# Patient Record
Sex: MALE | Race: WHITE | NOT HISPANIC OR LATINO
[De-identification: names, ages, dates, MRNs, and addresses within clinical notes are randomized per-mention and may not be internally consistent; named-entity substitution may affect disease eponyms.]

---

## 2018-10-17 ENCOUNTER — RX RENEWAL (OUTPATIENT)
Age: 58
End: 2018-10-17

## 2018-10-17 PROBLEM — Z00.00 ENCOUNTER FOR PREVENTIVE HEALTH EXAMINATION: Status: ACTIVE | Noted: 2018-10-17

## 2018-10-18 ENCOUNTER — RX RENEWAL (OUTPATIENT)
Age: 58
End: 2018-10-18

## 2018-10-18 DIAGNOSIS — N52.9 MALE ERECTILE DYSFUNCTION, UNSPECIFIED: ICD-10-CM

## 2018-11-20 ENCOUNTER — RECORD ABSTRACTING (OUTPATIENT)
Age: 58
End: 2018-11-20

## 2018-11-20 DIAGNOSIS — Z78.9 OTHER SPECIFIED HEALTH STATUS: ICD-10-CM

## 2018-11-20 DIAGNOSIS — Z86.39 PERSONAL HISTORY OF OTHER ENDOCRINE, NUTRITIONAL AND METABOLIC DISEASE: ICD-10-CM

## 2018-11-20 DIAGNOSIS — Z83.49 FAMILY HISTORY OF OTHER ENDOCRINE, NUTRITIONAL AND METABOLIC DISEASES: ICD-10-CM

## 2018-11-20 DIAGNOSIS — Z86.79 PERSONAL HISTORY OF OTHER DISEASES OF THE CIRCULATORY SYSTEM: ICD-10-CM

## 2018-11-20 DIAGNOSIS — Z83.3 FAMILY HISTORY OF DIABETES MELLITUS: ICD-10-CM

## 2018-11-20 DIAGNOSIS — I51.9 HEART DISEASE, UNSPECIFIED: ICD-10-CM

## 2018-11-21 ENCOUNTER — APPOINTMENT (OUTPATIENT)
Dept: ENDOCRINOLOGY | Facility: CLINIC | Age: 58
End: 2018-11-21
Payer: COMMERCIAL

## 2018-11-21 VITALS
SYSTOLIC BLOOD PRESSURE: 118 MMHG | WEIGHT: 185 LBS | HEART RATE: 66 BPM | BODY MASS INDEX: 29.73 KG/M2 | DIASTOLIC BLOOD PRESSURE: 70 MMHG | HEIGHT: 66 IN

## 2018-11-21 DIAGNOSIS — E66.01 MORBID (SEVERE) OBESITY DUE TO EXCESS CALORIES: ICD-10-CM

## 2018-11-21 LAB — GLUCOSE BLDC GLUCOMTR-MCNC: 107

## 2018-11-21 PROCEDURE — 82962 GLUCOSE BLOOD TEST: CPT

## 2018-11-21 PROCEDURE — 99214 OFFICE O/P EST MOD 30 MIN: CPT | Mod: 25

## 2019-05-17 ENCOUNTER — APPOINTMENT (OUTPATIENT)
Dept: ENDOCRINOLOGY | Facility: CLINIC | Age: 59
End: 2019-05-17
Payer: COMMERCIAL

## 2019-05-17 VITALS
DIASTOLIC BLOOD PRESSURE: 70 MMHG | HEIGHT: 66 IN | SYSTOLIC BLOOD PRESSURE: 110 MMHG | WEIGHT: 192 LBS | BODY MASS INDEX: 30.86 KG/M2 | OXYGEN SATURATION: 98 % | HEART RATE: 63 BPM

## 2019-05-17 LAB
CHOLEST SERPL-MCNC: 137
GLUCOSE BLDC GLUCOMTR-MCNC: 119
GLUCOSE SERPL-MCNC: 90
HBA1C MFR BLD HPLC: 8.3
HDLC SERPL-MCNC: 69
LDLC SERPL DIRECT ASSAY-MCNC: 54
MICROALBUMIN/CREAT 24H UR-RTO: 13

## 2019-05-17 PROCEDURE — 99214 OFFICE O/P EST MOD 30 MIN: CPT | Mod: 25

## 2019-05-17 PROCEDURE — 82962 GLUCOSE BLOOD TEST: CPT

## 2019-05-17 RX ORDER — TADALAFIL 20 MG/1
20 TABLET ORAL
Qty: 20 | Refills: 3 | Status: DISCONTINUED | COMMUNITY
Start: 2018-10-18 | End: 2019-05-17

## 2019-05-17 RX ORDER — BLOOD SUGAR DIAGNOSTIC
STRIP MISCELLANEOUS
Refills: 0 | Status: DISCONTINUED | COMMUNITY
End: 2019-05-17

## 2019-05-17 NOTE — HISTORY OF PRESENT ILLNESS
[FreeTextEntry1] : Quality:  Type 2.   \par Severity:  Moderate\par Duration:  23 years\par Modifying Factors:  Better with diet.  Better with medication.   \par Notes:  Current regimen:\par 	Metformin 500 mg, 2 tabs BID ER\par 	Humalog 75/25 used PRN\par \par \par On avandia in the past, on byetta. Off actos when invokana started with loss of control since. No response to invokana.\par \par Diet:  trying to watch diet, occasional non-adherence\par \par Weight History:  \par Continued weight loss with gastric sleeve- total of 108 since January 2017\par \par Blood Glucose Testing Information \par \par Patient is using the  meter and is testing 2 times per day.\par \par scheduled for surgical procedure to remove excess skin\par \par \par Past Medical History\par Notes:  ASHD, stents. \par RBBB\par

## 2019-05-17 NOTE — REVIEW OF SYSTEMS
[Recent Weight Gain (___ Lbs)] : recent [unfilled] ~Ulb weight gain [Heartburn] : heartburn [Fatigue] : no fatigue

## 2019-05-17 NOTE — ASSESSMENT
[FreeTextEntry1] : Excellent control of diabetes, hyperlipidemia and hypertension.\par MIld anemia due to recent blood donation

## 2019-05-20 ENCOUNTER — MEDICATION RENEWAL (OUTPATIENT)
Age: 59
End: 2019-05-20

## 2019-09-01 ENCOUNTER — RX RENEWAL (OUTPATIENT)
Age: 59
End: 2019-09-01

## 2019-09-30 ENCOUNTER — MEDICATION RENEWAL (OUTPATIENT)
Age: 59
End: 2019-09-30

## 2019-10-03 ENCOUNTER — RX RENEWAL (OUTPATIENT)
Age: 59
End: 2019-10-03

## 2019-11-18 ENCOUNTER — APPOINTMENT (OUTPATIENT)
Dept: ENDOCRINOLOGY | Facility: CLINIC | Age: 59
End: 2019-11-18
Payer: COMMERCIAL

## 2019-11-18 VITALS
BODY MASS INDEX: 32.3 KG/M2 | HEART RATE: 70 BPM | DIASTOLIC BLOOD PRESSURE: 70 MMHG | HEIGHT: 66 IN | WEIGHT: 201 LBS | SYSTOLIC BLOOD PRESSURE: 112 MMHG

## 2019-11-18 LAB
GLUCOSE BLDC GLUCOMTR-MCNC: 117
HBA1C MFR BLD HPLC: 6.8
LDLC SERPL DIRECT ASSAY-MCNC: 57
MICROALBUMIN/CREAT 24H UR-RTO: 10

## 2019-11-18 PROCEDURE — 99214 OFFICE O/P EST MOD 30 MIN: CPT | Mod: 25

## 2019-11-18 PROCEDURE — 82962 GLUCOSE BLOOD TEST: CPT | Mod: NC

## 2019-11-18 RX ORDER — SYRINGE-NEEDLE,INSULIN,0.5 ML 31 GX5/16"
31G X 5/16" SYRINGE, EMPTY DISPOSABLE MISCELLANEOUS
Refills: 0 | Status: DISCONTINUED | COMMUNITY
End: 2019-11-18

## 2019-11-18 NOTE — ASSESSMENT
[FreeTextEntry1] : Excellent control of diabetes, hyperlipidemia and hypertension.\par MIld anemia due to recent blood donation; however ferritin low; increase oral iron and repepat in one month. If not improved will refer to hematology\par

## 2020-04-16 ENCOUNTER — RX RENEWAL (OUTPATIENT)
Age: 60
End: 2020-04-16

## 2020-05-21 ENCOUNTER — APPOINTMENT (OUTPATIENT)
Dept: ENDOCRINOLOGY | Facility: CLINIC | Age: 60
End: 2020-05-21
Payer: COMMERCIAL

## 2020-05-21 DIAGNOSIS — E11.65 TYPE 2 DIABETES MELLITUS WITH HYPERGLYCEMIA: ICD-10-CM

## 2020-05-21 PROCEDURE — 99214 OFFICE O/P EST MOD 30 MIN: CPT | Mod: 95

## 2020-05-21 NOTE — REVIEW OF SYSTEMS
[Joint Pain] : joint pain [Chest Pain] : no chest pain [Shortness Of Breath] : no shortness of breath [FreeTextEntry9] : shoulder pain, s/p steroid injection

## 2020-05-21 NOTE — PHYSICAL EXAM
[Alert] : alert [Normal Insight/Judgement] : insight and judgment were intact [Oriented x3] : oriented to person, place, and time [No Acute Distress] : no acute distress

## 2020-05-21 NOTE — ASSESSMENT
[FreeTextEntry1] : DM type 2, progressive but mild loss of control. Will add ozempic\par Hyperlipidemia controlled\par check ferritin level

## 2020-05-21 NOTE — HISTORY OF PRESENT ILLNESS
[Other Location: e.g. Home (Enter Location, City,State)___] : at [unfilled] [Home] : at home, [unfilled] , at the time of the visit. [Verbal consent obtained from patient] : the patient, [unfilled] [FreeTextEntry1] : Quality:  Type 2.   \par Severity:  Moderate\par Duration:  23 years\par Modifying Factors:  Better with diet.  Better with medication.   \par Notes:  Current regimen:\par 	Metformin 500 mg, 2 tabs BID ER\par 	Humalog 75/25 used PRN\par \par \par On avandia in the past, on byetta. Off actos when invokana. No response to invokana.\par \par Diet:  trying to watch diet, occasional non-adherence\par \par Weight History:  \par Continued weight loss with gastric sleeve- total of 108 since January 2017\par \par Blood Glucose Testing Information \par \par Patient is using the  meter and is testing 2 times per day.\par \par \par Past Medical History\par Notes:  ASHD, stents. \par RBBB\par

## 2020-07-16 LAB
HBA1C MFR BLD HPLC: 7.3
LDLC SERPL DIRECT ASSAY-MCNC: 66
MICROALBUMIN/CREAT 24H UR-RTO: 8

## 2020-07-17 ENCOUNTER — TRANSCRIPTION ENCOUNTER (OUTPATIENT)
Age: 60
End: 2020-07-17

## 2020-07-17 ENCOUNTER — APPOINTMENT (OUTPATIENT)
Dept: ENDOCRINOLOGY | Facility: CLINIC | Age: 60
End: 2020-07-17
Payer: COMMERCIAL

## 2020-07-17 VITALS
HEIGHT: 66 IN | DIASTOLIC BLOOD PRESSURE: 70 MMHG | SYSTOLIC BLOOD PRESSURE: 120 MMHG | HEART RATE: 82 BPM | BODY MASS INDEX: 32.78 KG/M2 | WEIGHT: 204 LBS

## 2020-07-17 LAB — GLUCOSE BLDC GLUCOMTR-MCNC: 200

## 2020-07-17 PROCEDURE — 99214 OFFICE O/P EST MOD 30 MIN: CPT | Mod: 25

## 2020-07-17 PROCEDURE — 82962 GLUCOSE BLOOD TEST: CPT

## 2020-07-17 NOTE — HISTORY OF PRESENT ILLNESS
[FreeTextEntry1] : Quality:  Type 2.   \par Severity:  Moderate\par Duration:  23 years\par Modifying Factors:  Better with diet.  Better with medication.   \par Notes:  Current regimen:\par 	Metformin 500 mg, 2 tabs BID ER\par 	Humalog 75/25 used PRN\par                 Ozempic .5\par \par \par On avandia in the past, on byetta. Off actos when invokana. No response to invokana.\par \par Diet:  trying to watch diet, occasional non-adherence\par \par Weight History:  \par Continued weight loss with gastric sleeve- total of 108 since January 2017\par \par Blood Glucose Testing Information \par \par Patient is using the  meter and is testing 2 times per day.\par \par \par Past Medical History\par Notes:  ASHD, stents. \par RBBB\par

## 2020-07-17 NOTE — ASSESSMENT
[FreeTextEntry1] : DM type 2, improving control on ozempic by history; labs pending to verify. Discussed option of increasing to 1 mg if control declines.\par Hyperlipidemia, on therapy\par hypertension controlled\par

## 2020-07-17 NOTE — PHYSICAL EXAM
[Clear to Auscultation] : lungs were clear to auscultation bilaterally [Thyroid Not Enlarged] : the thyroid was not enlarged [Normal Rate] : heart rate was normal [Regular Rhythm] : with a regular rhythm [Right Foot Was Examined] : right foot ~C was examined [Left Foot Was Examined] : left foot ~C was examined

## 2020-07-21 ENCOUNTER — TRANSCRIPTION ENCOUNTER (OUTPATIENT)
Age: 60
End: 2020-07-21

## 2020-10-01 LAB
HBA1C MFR BLD HPLC: 6.2
LDLC SERPL CALC-MCNC: 61
MICROALBUMIN/CREAT 24H UR-RTO: 13

## 2020-10-02 ENCOUNTER — APPOINTMENT (OUTPATIENT)
Dept: ENDOCRINOLOGY | Facility: CLINIC | Age: 60
End: 2020-10-02
Payer: COMMERCIAL

## 2020-10-02 VITALS
DIASTOLIC BLOOD PRESSURE: 80 MMHG | OXYGEN SATURATION: 99 % | HEART RATE: 83 BPM | BODY MASS INDEX: 31.82 KG/M2 | WEIGHT: 198 LBS | HEIGHT: 66 IN | SYSTOLIC BLOOD PRESSURE: 120 MMHG

## 2020-10-02 LAB — GLUCOSE BLDC GLUCOMTR-MCNC: 93

## 2020-10-02 PROCEDURE — 99214 OFFICE O/P EST MOD 30 MIN: CPT | Mod: 25

## 2020-10-02 PROCEDURE — 82962 GLUCOSE BLOOD TEST: CPT

## 2020-10-02 NOTE — ASSESSMENT
[FreeTextEntry1] : DM type 2, excellent control\par Hyperlipidemia, on therapy\par hypertension controlled\par discussed iron deficiency. Pt. will continue supplements orally with ascorbic acid\par

## 2021-02-17 LAB
HBA1C MFR BLD HPLC: 6.1
LDLC SERPL DIRECT ASSAY-MCNC: 72
MICROALBUMIN/CREAT 24H UR-RTO: 9

## 2021-02-18 ENCOUNTER — APPOINTMENT (OUTPATIENT)
Dept: ENDOCRINOLOGY | Facility: CLINIC | Age: 61
End: 2021-02-18
Payer: COMMERCIAL

## 2021-02-18 VITALS
WEIGHT: 192 LBS | OXYGEN SATURATION: 99 % | SYSTOLIC BLOOD PRESSURE: 140 MMHG | BODY MASS INDEX: 30.86 KG/M2 | HEIGHT: 66 IN | DIASTOLIC BLOOD PRESSURE: 80 MMHG | HEART RATE: 78 BPM

## 2021-02-18 LAB — GLUCOSE BLDC GLUCOMTR-MCNC: 103

## 2021-02-18 PROCEDURE — 99072 ADDL SUPL MATRL&STAF TM PHE: CPT

## 2021-02-18 PROCEDURE — 99214 OFFICE O/P EST MOD 30 MIN: CPT | Mod: 25

## 2021-02-18 PROCEDURE — 82962 GLUCOSE BLOOD TEST: CPT

## 2021-02-18 NOTE — ASSESSMENT
[FreeTextEntry1] : DM type 2, excellent control\par Hyperlipidemia, on therapy\par hypertension controlled\par discussed iron deficiency. May need iron infusion\par ED, unresponsive to Cialis and viagra, will try levitra. If no response needs urology. Will check T level and COVID AB as per pt's request (He has been vaccinated)\par

## 2021-03-03 ENCOUNTER — TRANSCRIPTION ENCOUNTER (OUTPATIENT)
Age: 61
End: 2021-03-03

## 2021-03-17 ENCOUNTER — TRANSCRIPTION ENCOUNTER (OUTPATIENT)
Age: 61
End: 2021-03-17

## 2021-03-18 ENCOUNTER — TRANSCRIPTION ENCOUNTER (OUTPATIENT)
Age: 61
End: 2021-03-18

## 2021-09-03 ENCOUNTER — APPOINTMENT (OUTPATIENT)
Dept: ENDOCRINOLOGY | Facility: CLINIC | Age: 61
End: 2021-09-03
Payer: COMMERCIAL

## 2021-09-03 VITALS
SYSTOLIC BLOOD PRESSURE: 130 MMHG | WEIGHT: 197 LBS | HEART RATE: 74 BPM | HEIGHT: 66 IN | BODY MASS INDEX: 31.66 KG/M2 | DIASTOLIC BLOOD PRESSURE: 80 MMHG

## 2021-09-03 LAB — GLUCOSE BLDC GLUCOMTR-MCNC: 119

## 2021-09-03 PROCEDURE — 99214 OFFICE O/P EST MOD 30 MIN: CPT | Mod: 25

## 2021-09-03 PROCEDURE — 82962 GLUCOSE BLOOD TEST: CPT

## 2021-09-03 NOTE — ASSESSMENT
[FreeTextEntry1] : DM type 2, excellent control\par Hyperlipidemia, on therapy\par hypertension controlled\par discussed iron deficiency. Add vitamin C to iron.\par

## 2022-01-03 ENCOUNTER — APPOINTMENT (OUTPATIENT)
Dept: ENDOCRINOLOGY | Facility: CLINIC | Age: 62
End: 2022-01-03

## 2022-02-11 ENCOUNTER — APPOINTMENT (OUTPATIENT)
Dept: ENDOCRINOLOGY | Facility: CLINIC | Age: 62
End: 2022-02-11
Payer: COMMERCIAL

## 2022-02-11 VITALS
DIASTOLIC BLOOD PRESSURE: 78 MMHG | SYSTOLIC BLOOD PRESSURE: 130 MMHG | OXYGEN SATURATION: 98 % | HEART RATE: 89 BPM | BODY MASS INDEX: 32.14 KG/M2 | HEIGHT: 66 IN | WEIGHT: 200 LBS

## 2022-02-11 LAB
GLUCOSE BLDC GLUCOMTR-MCNC: 137
HBA1C MFR BLD HPLC: 6.2
LDLC SERPL DIRECT ASSAY-MCNC: 62
MICROALBUMIN/CREAT 24H UR-RTO: 20

## 2022-02-11 PROCEDURE — 82962 GLUCOSE BLOOD TEST: CPT

## 2022-02-11 PROCEDURE — 99214 OFFICE O/P EST MOD 30 MIN: CPT | Mod: 25

## 2022-02-11 NOTE — HISTORY OF PRESENT ILLNESS
[FreeTextEntry1] : Quality:  Type 2.   \par Severity:  Moderate\par Duration:  24 years\par Modifying Factors:  Better with diet.  Better with medication.   \par Notes:  Current regimen:\par 	Metformin 500 mg, 2 tabs BID ER\par 	Humalog 75/25 used PRN\par                 Ozempic .5\par \par \par On avandia in the past, on byetta. Off actos when invokana. No response to invokana.\par \par Diet:  trying to watch diet, occasional non-adherence\par \par Weight History:  \par Continued weight loss with gastric sleeve- total of 108 since January 2017\par \par Blood Glucose Testing Information \par \par Patient is using the  meter and is testing 2 times per day.\par \par \par Past Medical History\par Notes:  ASHD, stents. \par RBBB\par

## 2022-02-11 NOTE — ASSESSMENT
[FreeTextEntry1] : DM type 2, excellent control\par Hyperlipidemia, on therapy\par hypertension controlled\par \par

## 2022-03-28 ENCOUNTER — RX RENEWAL (OUTPATIENT)
Age: 62
End: 2022-03-28

## 2022-06-19 ENCOUNTER — RX RENEWAL (OUTPATIENT)
Age: 62
End: 2022-06-19

## 2022-08-12 LAB
HBA1C MFR BLD HPLC: 6.4
LDLC SERPL DIRECT ASSAY-MCNC: 64
MICROALBUMIN/CREAT 24H UR-RTO: 11
TSH SERPL-ACNC: 1.79

## 2022-08-15 ENCOUNTER — RESULT CHARGE (OUTPATIENT)
Age: 62
End: 2022-08-15

## 2022-08-15 ENCOUNTER — APPOINTMENT (OUTPATIENT)
Dept: ENDOCRINOLOGY | Facility: CLINIC | Age: 62
End: 2022-08-15

## 2022-08-15 VITALS
HEART RATE: 75 BPM | SYSTOLIC BLOOD PRESSURE: 120 MMHG | BODY MASS INDEX: 33.27 KG/M2 | WEIGHT: 207 LBS | DIASTOLIC BLOOD PRESSURE: 80 MMHG | HEIGHT: 66 IN

## 2022-08-15 DIAGNOSIS — I10 ESSENTIAL (PRIMARY) HYPERTENSION: ICD-10-CM

## 2022-08-15 LAB — GLUCOSE BLDC GLUCOMTR-MCNC: 119

## 2022-08-15 PROCEDURE — 99214 OFFICE O/P EST MOD 30 MIN: CPT | Mod: 25

## 2022-08-15 PROCEDURE — 82962 GLUCOSE BLOOD TEST: CPT

## 2022-09-08 ENCOUNTER — RX RENEWAL (OUTPATIENT)
Age: 62
End: 2022-09-08

## 2022-11-27 ENCOUNTER — RX RENEWAL (OUTPATIENT)
Age: 62
End: 2022-11-27

## 2022-12-20 ENCOUNTER — OFFICE (OUTPATIENT)
Dept: URBAN - METROPOLITAN AREA CLINIC 105 | Facility: CLINIC | Age: 62
Setting detail: OPHTHALMOLOGY
End: 2022-12-20
Payer: COMMERCIAL

## 2022-12-20 DIAGNOSIS — H17.9: ICD-10-CM

## 2022-12-20 DIAGNOSIS — H33.313: ICD-10-CM

## 2022-12-20 DIAGNOSIS — E11.9: ICD-10-CM

## 2022-12-20 DIAGNOSIS — H40.013: ICD-10-CM

## 2022-12-20 DIAGNOSIS — H25.13: ICD-10-CM

## 2022-12-20 PROCEDURE — 92083 EXTENDED VISUAL FIELD XM: CPT | Performed by: OPHTHALMOLOGY

## 2022-12-20 PROCEDURE — 92012 INTRM OPH EXAM EST PATIENT: CPT | Performed by: OPHTHALMOLOGY

## 2022-12-20 ASSESSMENT — VISUAL ACUITY
OS_BCVA: 20/25+1
OD_BCVA: 20/25+1

## 2022-12-20 ASSESSMENT — PACHYMETRY
OD_CT_CORRECTION: 7
OD_CT_UM: 445
OS_CT_UM: 468
OS_CT_CORRECTION: 6

## 2022-12-20 ASSESSMENT — REFRACTION_AUTOREFRACTION
OS_CYLINDER: -0.75
OD_CYLINDER: -0.75
OD_AXIS: 082
OS_SPHERE: +0.25
OS_AXIS: 070
OD_SPHERE: 0.00

## 2022-12-20 ASSESSMENT — SPHEQUIV_DERIVED
OS_SPHEQUIV: -0.125
OD_SPHEQUIV: -0.375

## 2022-12-20 ASSESSMENT — KERATOMETRY
OS_K1POWER_DIOPTERS: 44.50
OS_K2POWER_DIOPTERS: 45.00
OD_K1POWER_DIOPTERS: 44.50
OD_K2POWER_DIOPTERS: 45.00
OS_AXISANGLE_DEGREES: 144
OD_AXISANGLE_DEGREES: 020

## 2022-12-20 ASSESSMENT — AXIALLENGTH_DERIVED
OD_AL: 23.283
OS_AL: 23.1885

## 2022-12-20 ASSESSMENT — CONFRONTATIONAL VISUAL FIELD TEST (CVF)
OS_FINDINGS: FULL
OD_FINDINGS: FULL

## 2022-12-20 ASSESSMENT — TONOMETRY
OS_IOP_MMHG: 13
OD_IOP_MMHG: 12

## 2023-02-02 LAB
HBA1C MFR BLD HPLC: 6.8
LDLC SERPL CALC-MCNC: 61
MICROALBUMIN/CREAT 24H UR-RTO: 8

## 2023-02-03 ENCOUNTER — APPOINTMENT (OUTPATIENT)
Dept: ENDOCRINOLOGY | Facility: CLINIC | Age: 63
End: 2023-02-03
Payer: COMMERCIAL

## 2023-02-03 PROCEDURE — 99214 OFFICE O/P EST MOD 30 MIN: CPT | Mod: 95

## 2023-02-03 NOTE — HISTORY OF PRESENT ILLNESS
[Home] : at home, [unfilled] , at the time of the visit. [Other Location: e.g. Home (Enter Location, City,State)___] : at [unfilled] [Verbal consent obtained from patient] : the patient, [unfilled] [FreeTextEntry1] : Quality:  Type 2.   \par Severity:  Moderate\par Duration:  25 years\par Modifying Factors:  Better with diet.  Better with medication.   \par Notes:  Current regimen:\par 	Metformin 500 mg, 2 tabs BID ER\par 	Humalog 75/25 used PRN\par                 Ozempic .5\par \par \par On avandia in the past, on byetta. Off actos when invokana. No response to invokana.\par \par Diet:  trying to watch diet, occasional non-adherence\par \par Weight History:  \par Continued weight loss with gastric sleeve- total of 108 since January 2017\par \par Blood Glucose Testing Information \par \par Patient is using the  meter and is testing 2 times per day.\par \par \par Past Medical History\par Notes:  ASHD, stents. \par RBBB\par

## 2023-02-03 NOTE — ASSESSMENT
[FreeTextEntry1] : DM type 2, mild loss of control. Increase ozempic to 1 mg\par Hyperlipidemia, on therapy\par ED remains problematic Seen by urology. Wants another trial of viagra\par \par

## 2023-02-17 ENCOUNTER — APPOINTMENT (OUTPATIENT)
Dept: ENDOCRINOLOGY | Facility: CLINIC | Age: 63
End: 2023-02-17

## 2023-08-03 ENCOUNTER — APPOINTMENT (OUTPATIENT)
Dept: ENDOCRINOLOGY | Facility: CLINIC | Age: 63
End: 2023-08-03
Payer: COMMERCIAL

## 2023-08-03 VITALS
DIASTOLIC BLOOD PRESSURE: 82 MMHG | HEART RATE: 81 BPM | HEIGHT: 66 IN | BODY MASS INDEX: 32.95 KG/M2 | SYSTOLIC BLOOD PRESSURE: 118 MMHG | WEIGHT: 205 LBS | OXYGEN SATURATION: 98 %

## 2023-08-03 DIAGNOSIS — E78.00 PURE HYPERCHOLESTEROLEMIA, UNSPECIFIED: ICD-10-CM

## 2023-08-03 LAB — GLUCOSE BLDC GLUCOMTR-MCNC: 114

## 2023-08-03 PROCEDURE — 99214 OFFICE O/P EST MOD 30 MIN: CPT | Mod: 25

## 2023-08-03 PROCEDURE — 82962 GLUCOSE BLOOD TEST: CPT | Mod: NC

## 2023-08-03 RX ORDER — VARDENAFIL 20 MG/1
20 TABLET, FILM COATED ORAL
Qty: 6 | Refills: 5 | Status: DISCONTINUED | COMMUNITY
Start: 2021-02-18 | End: 2023-08-03

## 2023-08-03 NOTE — HISTORY OF PRESENT ILLNESS
[FreeTextEntry1] : Quality:  Type 2.    Severity:  Moderate Duration:  25 years Modifying Factors:  Better with diet.  Better with medication.    Notes:  Current regimen: 	Metformin 500 mg, 2 tabs BID ER 	Humalog 75/25 used PRN                 Ozempic 1   On avandia in the past, on byetta. Off actos when invokana. No response to invokana.  Diet:  trying to watch diet, occasional non-adherence  Weight History:   Continued weight loss with gastric sleeve- total of 108 since January 2017  Blood Glucose Testing Information   Patient is using the meter and is testing 2 times per day.   Past Medical History Notes:  ASHD, stents.  RBBB

## 2023-09-20 ENCOUNTER — RX RENEWAL (OUTPATIENT)
Age: 63
End: 2023-09-20

## 2024-02-07 LAB
HBA1C MFR BLD HPLC: 6.4
LDLC SERPL DIRECT ASSAY-MCNC: 67
TSH SERPL-ACNC: 1.59

## 2024-02-09 ENCOUNTER — APPOINTMENT (OUTPATIENT)
Dept: ENDOCRINOLOGY | Facility: CLINIC | Age: 64
End: 2024-02-09
Payer: COMMERCIAL

## 2024-02-09 VITALS — OXYGEN SATURATION: 99 % | BODY MASS INDEX: 32.14 KG/M2 | HEIGHT: 66 IN | WEIGHT: 200 LBS | HEART RATE: 76 BPM

## 2024-02-09 DIAGNOSIS — E11.9 TYPE 2 DIABETES MELLITUS W/OUT COMPLICATIONS: ICD-10-CM

## 2024-02-09 DIAGNOSIS — E61.1 IRON DEFICIENCY: ICD-10-CM

## 2024-02-09 LAB — GLUCOSE BLDC GLUCOMTR-MCNC: 119

## 2024-02-09 PROCEDURE — 99214 OFFICE O/P EST MOD 30 MIN: CPT

## 2024-02-09 PROCEDURE — 82962 GLUCOSE BLOOD TEST: CPT

## 2024-02-09 RX ORDER — METOPROLOL SUCCINATE 25 MG/1
25 TABLET, EXTENDED RELEASE ORAL
Qty: 90 | Refills: 3 | Status: ACTIVE | COMMUNITY
Start: 2019-09-01 | End: 1900-01-01

## 2024-02-09 RX ORDER — LOSARTAN POTASSIUM 25 MG/1
25 TABLET, FILM COATED ORAL
Qty: 90 | Refills: 3 | Status: ACTIVE | COMMUNITY
Start: 1900-01-01 | End: 1900-01-01

## 2024-02-09 RX ORDER — OMEPRAZOLE 40 MG/1
40 CAPSULE, DELAYED RELEASE ORAL
Qty: 90 | Refills: 3 | Status: ACTIVE | COMMUNITY
Start: 2022-06-19 | End: 1900-01-01

## 2024-02-09 RX ORDER — SEMAGLUTIDE 1.34 MG/ML
4 INJECTION, SOLUTION SUBCUTANEOUS
Qty: 3 | Refills: 3 | Status: ACTIVE | COMMUNITY
Start: 2020-05-21 | End: 1900-01-01

## 2024-02-09 RX ORDER — SIMVASTATIN 10 MG/1
10 TABLET, FILM COATED ORAL
Qty: 90 | Refills: 3 | Status: ACTIVE | COMMUNITY
Start: 1900-01-01 | End: 1900-01-01

## 2024-02-09 RX ORDER — SILDENAFIL 100 MG/1
100 TABLET, FILM COATED ORAL
Qty: 8 | Refills: 3 | Status: ACTIVE | COMMUNITY
Start: 2023-02-03 | End: 1900-01-01

## 2024-02-09 RX ORDER — CLOPIDOGREL BISULFATE 75 MG/1
75 TABLET, FILM COATED ORAL DAILY
Qty: 90 | Refills: 3 | Status: ACTIVE | COMMUNITY
Start: 1900-01-01 | End: 1900-01-01

## 2024-02-09 RX ORDER — BLOOD SUGAR DIAGNOSTIC
STRIP MISCELLANEOUS
Qty: 300 | Refills: 3 | Status: ACTIVE | COMMUNITY
Start: 2019-05-17 | End: 1900-01-01

## 2024-02-09 RX ORDER — METFORMIN ER 500 MG 500 MG/1
500 TABLET ORAL
Qty: 360 | Refills: 3 | Status: ACTIVE | COMMUNITY
Start: 2022-03-28 | End: 1900-01-01

## 2024-02-09 NOTE — HISTORY OF PRESENT ILLNESS
[FreeTextEntry1] : Quality:  Type 2.    Severity:  Moderate Duration:  26 years Modifying Factors:  Better with diet.  Better with medication.    Notes:  Current regimen: 	Metformin 500 mg, 2 tabs BID ER 	Humalog 75/25 used PRN                 Ozempic 1   On avandia in the past, on byetta. Off actos when invokana. No response to invokana.  Diet:  trying to watch diet, occasional non-adherence  Weight History:   Continued weight loss with gastric sleeve- total of 108 since January 2017  Blood Glucose Testing Information   Patient is using the meter and is testing 2 times per day.   Past Medical History Notes:  ASHD, stents.  RBBB

## 2024-02-09 NOTE — ASSESSMENT
[FreeTextEntry1] : DM type 2, well controlled Hyperlipidemia, on therapy ED remains problematic  discussed low ferritin, and advised hematology evaluation, likely needs iron infusions

## 2024-08-30 LAB
HBA1C MFR BLD HPLC: 6.4
LDLC SERPL DIRECT ASSAY-MCNC: 65
MICROALBUMIN/CREAT 24H UR-RTO: 8
TSH SERPL-ACNC: 1.38

## 2024-09-05 ENCOUNTER — APPOINTMENT (OUTPATIENT)
Dept: ENDOCRINOLOGY | Facility: CLINIC | Age: 64
End: 2024-09-05
Payer: COMMERCIAL

## 2024-09-05 VITALS
BODY MASS INDEX: 31.82 KG/M2 | HEIGHT: 66 IN | DIASTOLIC BLOOD PRESSURE: 80 MMHG | OXYGEN SATURATION: 98 % | HEART RATE: 72 BPM | WEIGHT: 198 LBS | SYSTOLIC BLOOD PRESSURE: 140 MMHG

## 2024-09-05 DIAGNOSIS — E61.1 IRON DEFICIENCY: ICD-10-CM

## 2024-09-05 DIAGNOSIS — E11.65 TYPE 2 DIABETES MELLITUS WITH HYPERGLYCEMIA: ICD-10-CM

## 2024-09-05 DIAGNOSIS — E78.00 PURE HYPERCHOLESTEROLEMIA, UNSPECIFIED: ICD-10-CM

## 2024-09-05 LAB — GLUCOSE BLDC GLUCOMTR-MCNC: 118

## 2024-09-05 PROCEDURE — 82962 GLUCOSE BLOOD TEST: CPT

## 2024-09-05 PROCEDURE — G2211 COMPLEX E/M VISIT ADD ON: CPT | Mod: NC

## 2024-09-05 PROCEDURE — 99214 OFFICE O/P EST MOD 30 MIN: CPT

## 2024-09-05 NOTE — HISTORY OF PRESENT ILLNESS
[FreeTextEntry1] : Quality:  Type 2.    Severity:  Moderate Duration:  26 years Modifying Factors:  Better with diet.  Better with medication.    Notes:  Current regimen: 	Metformin 500 mg, 2 tabs BID ER 	Humalog 75/25 used PRN - not needed                 Ozempic 1   On avandia in the past, on byetta. Off actos when invokana. No response to invokana.  Diet:  trying to watch diet, occasional non-adherence  Weight History:   Continued weight loss with gastric sleeve- total of 108 since January 2017  Blood Glucose Testing Information   Patient is using the meter and is testing 2 times per day.   Past Medical History Notes:  ASHD, stents.  RBBB

## 2025-04-14 LAB
HBA1C MFR BLD HPLC: 6.5
LDLC SERPL DIRECT ASSAY-MCNC: 62
MICROALBUMIN/CREAT 24H UR-RTO: 10
TSH SERPL-ACNC: 0.97

## 2025-04-17 ENCOUNTER — APPOINTMENT (OUTPATIENT)
Dept: ENDOCRINOLOGY | Facility: CLINIC | Age: 65
End: 2025-04-17
Payer: COMMERCIAL

## 2025-04-17 VITALS
WEIGHT: 196 LBS | SYSTOLIC BLOOD PRESSURE: 130 MMHG | BODY MASS INDEX: 31.64 KG/M2 | OXYGEN SATURATION: 97 % | DIASTOLIC BLOOD PRESSURE: 70 MMHG | HEART RATE: 78 BPM

## 2025-04-17 DIAGNOSIS — E78.00 PURE HYPERCHOLESTEROLEMIA, UNSPECIFIED: ICD-10-CM

## 2025-04-17 DIAGNOSIS — E11.65 TYPE 2 DIABETES MELLITUS WITH HYPERGLYCEMIA: ICD-10-CM

## 2025-04-17 DIAGNOSIS — E61.1 IRON DEFICIENCY: ICD-10-CM

## 2025-04-17 LAB
GLUCOSE BLDC GLUCOMTR-MCNC: 162
HBA1C MFR BLD HPLC: 6.2
LDLC SERPL DIRECT ASSAY-MCNC: 72
MICROALBUMIN/CREAT 24H UR-RTO: 11
TSH SERPL-ACNC: 0.95

## 2025-04-17 PROCEDURE — G2211 COMPLEX E/M VISIT ADD ON: CPT | Mod: NC

## 2025-04-17 PROCEDURE — 99214 OFFICE O/P EST MOD 30 MIN: CPT

## 2025-05-01 ENCOUNTER — APPOINTMENT (OUTPATIENT)
Dept: ENDOCRINOLOGY | Facility: CLINIC | Age: 65
End: 2025-05-01

## 2025-09-18 ENCOUNTER — NON-APPOINTMENT (OUTPATIENT)
Age: 65
End: 2025-09-18